# Patient Record
(demographics unavailable — no encounter records)

---

## 2025-01-30 NOTE — PHYSICAL EXAM
[General Appearance - Alert] : alert [Neck Appearance] : the appearance of the neck was normal [Auscultation Breath Sounds / Voice Sounds] : lungs were clear to auscultation bilaterally [Heart Rate And Rhythm] : heart rate was normal and rhythm regular [Heart Sounds] : normal S1 and S2 [Musculoskeletal - Swelling] : no joint swelling seen [Motor Tone] : muscle strength and tone were normal [FreeTextEntry1] : restriction on flexion and mild pain on active rom hands [] : no rash [No Focal Deficits] : no focal deficits [Oriented To Time, Place, And Person] : oriented to person, place, and time

## 2025-01-30 NOTE — HISTORY OF PRESENT ILLNESS
[FreeTextEntry1] : 76yo M in the office for evaluation, PMH HTN, HLD  History: Patient with progressive hand pain and stiffness subacute development 2-3 months ago significant am stiffness +30 min pain that varies with or without activity no synovitis no dactylitis no constitutional symptoms no skin rashes no oral ulcers no reports or history of uveitis  Prior history of cervical fusion, C1-c2  PCP eval elevated esr and crp low positive RF negative CCP cbc elevated plt and wbc